# Patient Record
Sex: MALE | Race: WHITE | NOT HISPANIC OR LATINO | URBAN - METROPOLITAN AREA
[De-identification: names, ages, dates, MRNs, and addresses within clinical notes are randomized per-mention and may not be internally consistent; named-entity substitution may affect disease eponyms.]

---

## 2017-11-22 ENCOUNTER — EMERGENCY (EMERGENCY)
Facility: HOSPITAL | Age: 82
LOS: 0 days | Discharge: HOME | End: 2017-11-22
Attending: STUDENT IN AN ORGANIZED HEALTH CARE EDUCATION/TRAINING PROGRAM
Payer: COMMERCIAL

## 2017-11-22 VITALS
SYSTOLIC BLOOD PRESSURE: 191 MMHG | HEART RATE: 79 BPM | DIASTOLIC BLOOD PRESSURE: 79 MMHG | RESPIRATION RATE: 16 BRPM | WEIGHT: 160.06 LBS | HEIGHT: 65 IN | TEMPERATURE: 97 F

## 2017-11-22 DIAGNOSIS — Z98.890 OTHER SPECIFIED POSTPROCEDURAL STATES: Chronic | ICD-10-CM

## 2017-11-22 DIAGNOSIS — S01.112A LACERATION WITHOUT FOREIGN BODY OF LEFT EYELID AND PERIOCULAR AREA, INITIAL ENCOUNTER: ICD-10-CM

## 2017-11-22 DIAGNOSIS — S00.83XA CONTUSION OF OTHER PART OF HEAD, INITIAL ENCOUNTER: ICD-10-CM

## 2017-11-22 DIAGNOSIS — Z23 ENCOUNTER FOR IMMUNIZATION: ICD-10-CM
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
## 2017-11-22 DIAGNOSIS — S60.512A ABRASION OF LEFT HAND, INITIAL ENCOUNTER: ICD-10-CM

## 2017-11-22 DIAGNOSIS — S80.212A ABRASION, LEFT KNEE, INITIAL ENCOUNTER: ICD-10-CM
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
## 2017-11-22 PROCEDURE — 70486 CT MAXILLOFACIAL W/O DYE: CPT | Mod: 26

## 2017-11-22 PROCEDURE — 72125 CT NECK SPINE W/O DYE: CPT | Mod: 26

## 2017-11-22 PROCEDURE — 70450 CT HEAD/BRAIN W/O DYE: CPT | Mod: 26

## 2017-11-22 PROCEDURE — 12013 RPR F/E/E/N/L/M 2.6-5.0 CM: CPT

## 2017-11-22 PROCEDURE — 76377 3D RENDER W/INTRP POSTPROCES: CPT | Mod: 26

## 2017-11-22 PROCEDURE — 99285 EMERGENCY DEPT VISIT HI MDM: CPT | Mod: 25

## 2017-11-22 PROCEDURE — 99053 MED SERV 10PM-8AM 24 HR FAC: CPT

## 2017-11-22 RX ORDER — TETANUS TOXOID, REDUCED DIPHTHERIA TOXOID AND ACELLULAR PERTUSSIS VACCINE, ADSORBED 5; 2.5; 8; 8; 2.5 [IU]/.5ML; [IU]/.5ML; UG/.5ML; UG/.5ML; UG/.5ML
0.5 SUSPENSION INTRAMUSCULAR ONCE
Qty: 0 | Refills: 0 | Status: COMPLETED | OUTPATIENT
Start: 2017-11-22 | End: 2017-11-22

## 2017-11-22 RX ADMIN — TETANUS TOXOID, REDUCED DIPHTHERIA TOXOID AND ACELLULAR PERTUSSIS VACCINE, ADSORBED 0.5 MILLILITER(S): 5; 2.5; 8; 8; 2.5 SUSPENSION INTRAMUSCULAR at 08:21
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
## 2017-11-22 NOTE — ED PROCEDURE NOTE - CPROC ED POST PROC CARE GUIDE1
Verbal/written post procedure instructions were given to patient/caregiver./pt told to return in one week for suture removal 97.3
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
## 2017-11-22 NOTE — ED PROVIDER NOTE - CARE PLAN
Principal Discharge DX:	Left eyelid laceration, initial encounter  Secondary Diagnosis:	Knee abrasion, left, initial encounter  Secondary Diagnosis:	Hand abrasion, left, initial encounter

## 2017-11-22 NOTE — ED PROVIDER NOTE - CONSTITUTIONAL, MLM
normal... Well appearing, well nourished, awake, alert, oriented to person, place, time/situation and in no apparent distress, pt denies pain at this time

## 2017-11-22 NOTE — ED PROVIDER NOTE - EXTREMITY EXAM
left lower extremity findings/small left hand abrasion on the dorsal aspect/left upper extremity findings

## 2024-02-09 NOTE — ED ADULT NURSE NOTE - TEMPLATE LIST FOR HEAD TO TOE ASSESSMENT
Run.  Status: [x] Progressing: [] Met: [] Not Met: [] Adjusted     Therapist goals for Patient:   Short Term Goals: To be achieved in: 2 weeks  Independent in HEP and progression per patient tolerance, in order to progress toward full function and prevent re-injury.               Status: [] Progressing: [x] Met: [] Not Met: [] Adjusted  Patient will have a decrease in pain to 0-1/10 to help  facilitate improvement in movement, function, and ADLs as indicated by functional deficits.              Status: [x] Progressing: [] Met: [] Not Met: [] Adjusted     Long Term Goals: To be achieved in: 12weeks  Disability index score of 15% or less for the Quick DASH to assist with return top prior level of function.                 Status: [x] Progressing: [] Met: [] Not Met: [] Adjusted  Improve ROM to WNL to allow for proper joint functioning as indicated by patients functional deficits.  Status: [x] Progressing: [] Met: [] Not Met: [] Adjusted  Pt to improve strength to 4+/5 or better of rotator cuff, scapular retractors, biceps, and triceps to allow for proper muscle and joint use in functional mobility, ADLs and prior level of function   Status: [x] Progressing: [] Met: [] Not Met: [] Adjusted  Patient will return to Reaching activities, Bathing/Grooming, Lifting, Writing, Computer work, Gardening, Driving, and Throwing without increased symptoms or restriction to work towards return to prior level of function.                                       Status: [x] Progressing: [] Met: [] Not Met: [] Adjusted  Patient will resume normal work/leisure activities without pain.                                                                                                                Status: [x] Progressing: [] Met: [] Not Met: [] Adjusted       Overall Progression Towards Functional goals/ Treatment Progress Update:  [x] Patient is progressing as expected towards functional goals listed.    [] Progression is slowed due to  General

## 2024-05-15 NOTE — ED ADULT NURSE NOTE - NSSISCREENINGQ1_ED_A_ED
[facility] AMBULATORY ENCOUNTER  FAMILY PRACTICE ANNUAL PHYSICAL EXAM    Chief Complaint   Patient presents with    [facility] Care    Preventative Care     Diet questions, genital concerns and  labs         HISTORY OF PRESENT ILLNESS     Patient presents for their annual exam.     Concerns: Increasing protein diet with increased strength training, eating about 90-120g/day. Tightening of foreskin during erections that is causing some fissuring, feels it is less elastic, denies any swelling or rashes, dad had similar problem and needed a circumcision, he is not circumcised, inquires about referral. Requesting STD testing, had unprotected sex 5 years ago outside of his marriage.    Diet: enough fruits/vegetables   Exercise: free weights, 3-5 days/week   Smoking/Alcohol/Drugs: Denies   Sexually Transmitted Disease history: None   Urinary issues: some dribbling after, completely empties    Advance directive: None     Depression Screen:  Recent PHQ 2/9 Score    PHQ 2:  PHQ 2 Score Adult PHQ 2 Score Adult PHQ 2 Interpretation Little interest or pleasure in activity?   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     MEDICATIONS, ALLERGIES, PAST MEDICAL, SURGICAL, FAMILY AND SOCIAL HISTORY     I have reviewed the past medical history, surgical history, family history, social history, medications and allergies as obtained by my nursing staff and support staff and agree with their documentation as noted in the medical records on 5/15/2024    Current Outpatient Medications   Medication Sig Dispense Refill    CREATINE PO Take 5 g by mouth daily.      IBUPROFEN PO Take 60 mg by mouth as needed.       No current facility-administered medications for this visit.       ALLERGIES:  No Known Allergies    History reviewed. No pertinent past medical history.    History reviewed. No pertinent surgical history.    Family History   Problem  Relation Age of Onset    Asthma Son     ADHD/ADD Son     Depression Son     Cancer Maternal Uncle     Cancer Maternal Grandmother     Diabetes Paternal Grandfather        Social History     Socioeconomic History    Marital status: /Civil Union     Spouse name: Not on file    Number of children: Not on file    Years of education: Not on file    Highest education level: Not on file   Occupational History    Not on file   Tobacco Use    Smoking status: Never    Smokeless tobacco: Never   Substance and Sexual Activity    Alcohol use: No    Drug use: No    Sexual activity: Yes     Partners: Female   Other Topics Concern    Not on file   Social History Narrative    Not on file     Social Determinants of Health     Financial Resource Strain: Not on file   Food Insecurity: Not on file   Transportation Needs: Not on file   Physical Activity: Not on file   Stress: Not on file   Social Connections: Not on file   Interpersonal Safety: Not on file       Health Maintenance   Topic Date Due    COVID-19 Vaccine (5 - 2023-24 season) 09/01/2023    Depression Screening  05/15/2025    DTaP/Tdap/Td Vaccine (3 - Td or Tdap) 01/21/2026    Varicella Vaccine  Completed    Influenza Vaccine  Completed    Hepatitis B Vaccine  Completed    Meningococcal Vaccine  Aged Out    HPV Vaccine  Aged Out    Pneumococcal Vaccine 0-64  Aged Out         REVIEW OF SYSTEMS   See HPI above.     PHYSICAL EXAM     Visit Vitals  /82   Pulse 68   Temp 97.8 °F (36.6 °C) (Oral)   Resp 16   Ht 5' 5\" (1.651 m)   Wt 75.6 kg (166 lb 9.6 oz)   SpO2 99%   BMI 27.72 kg/m²     Constitutional:  Appears well kept and in no acute distress  Eyes: Normal conjunctivae and sclerae  Ear/Nose/Throat: Well-formed external ear and pinna; no external nasal deformities noted. The soft palate is symmetrical without lesion.  The posterior pharynx is without lesion, edema or erythema.   Neck: trachea appears midline  Skin: No visible rashes or lesions; skin is warm and  dry  Respiratory: Normal respiratory effort;  Bilaterally clear to auscultation   Cardiovascular: No edema in legs;  Regular rate and rhythm.  No murmur.  Normal S1 and S2.  Gastrointestinal: Abdomen: soft, non-tender, non-distended. Positive bowel sounds all quadrants. No guarding or rebound. No hepatomegaly or splenomegaly. Rectum: not performed   Musculoskeletal: Normal gait; appears to move all 4 extremities equally  Neurologic: no focal deficits  Psychiatric:  Normal mood and affect; normal judgment; alert and oriented times 3      ASSESSMENT/PLAN     Lenin was seen today for establish care and preventative care.    Diagnoses and all orders for this visit:    Annual physical exam  -     Comprehensive Metabolic Panel; Future  -     Glycohemoglobin; Future  -     Lipid Panel With Reflex; Future  -     CBC with Automated Differential; Future    Screen for STD (sexually transmitted disease)  -     Trichomonas vaginalis by Nucleic Acid Amplification  -     Chlamydia/Gonorrhea by Nucleic Acid Amplification  -     HIV 1/HIV 2 Antigen/Antibody Screen; Future  -     RPR (Rapid Plasma Reagin) Traditional Syphilis Screen Algorithm; Future    Foreskin problem  Lower urinary tract symptoms   -     SERVICE TO UROLOGY    Need for vaccination  -     VARICELLA (VARIVAX) LIVE    Overall feeling well. Provided preventative care and routine anticipatory guidance including advance directives, diet, exercise, and screenings. All questions answered. Immunizations reviewed. Patient to follow up annually.    Return in about 1 year (around 5/15/2025).    Balwinder Sevilla MD  5/15/2024         No